# Patient Record
Sex: MALE | Race: WHITE | NOT HISPANIC OR LATINO | Employment: FULL TIME | URBAN - METROPOLITAN AREA
[De-identification: names, ages, dates, MRNs, and addresses within clinical notes are randomized per-mention and may not be internally consistent; named-entity substitution may affect disease eponyms.]

---

## 2022-04-19 ENCOUNTER — OFFICE VISIT (OUTPATIENT)
Dept: URGENT CARE | Facility: CLINIC | Age: 32
End: 2022-04-19
Payer: COMMERCIAL

## 2022-04-19 DIAGNOSIS — M25.561 RIGHT MEDIAL KNEE PAIN: Primary | ICD-10-CM

## 2022-04-19 PROCEDURE — 99203 OFFICE O/P NEW LOW 30 MIN: CPT | Mod: S$GLB,,, | Performed by: FAMILY MEDICINE

## 2022-04-19 PROCEDURE — 99203 PR OFFICE/OUTPT VISIT, NEW, LEVL III, 30-44 MIN: ICD-10-PCS | Mod: S$GLB,,, | Performed by: FAMILY MEDICINE

## 2022-04-19 RX ORDER — HYDROCODONE BITARTRATE AND ACETAMINOPHEN 5; 325 MG/1; MG/1
1 TABLET ORAL EVERY 6 HOURS PRN
COMMUNITY
Start: 2022-02-05

## 2022-04-19 RX ORDER — IBUPROFEN 800 MG/1
800 TABLET ORAL EVERY 6 HOURS PRN
COMMUNITY
Start: 2022-02-05

## 2022-04-19 RX ORDER — AMOXICILLIN 875 MG/1
875 TABLET, FILM COATED ORAL 2 TIMES DAILY
COMMUNITY
Start: 2021-12-11

## 2022-04-19 NOTE — PROGRESS NOTES
Subjective:       Patient ID: Peña Cline is a 32 y.o. male.    Chief Complaint: Knee Pain    Patient works at [employer] and patient's job is [employer]  Date of initial injury: 04/03/2022  Description of injury: Right knee injury  What have you taken OTC for your symptoms: PT has taken OTC meds with significant relief and icing the area.  What is your current pain scale out of 10? 2/10  PT states he was walking and make a quick turn when he heard a popping noise and fell. Pt also felt a pop when it initially happened, he went to the ED for his initial visit. He said he could not put weight on the knee for a week, and he has been doing plenty of at home treatments. Pt states he initially tried to follow up after his initial visit through worker's comp but was put on hold so he decided to walk in and been seen to get released to return to work.    Knee Pain   The incident occurred more than 1 week ago. The incident occurred at work. The injury mechanism was a twisting injury. The pain is present in the right knee. The quality of the pain is described as aching. The pain is at a severity of 2/10. The pain has been improving since onset. Associated symptoms include a loss of motion. Pertinent negatives include no inability to bear weight, loss of sensation, muscle weakness, numbness or tingling. He reports no foreign bodies present. The symptoms are aggravated by movement. He has tried acetaminophen, elevation, ice, rest and non-weight bearing for the symptoms. The treatment provided moderate relief.       Neurological: Negative for numbness.        Objective:      Physical Exam  Musculoskeletal:      Right knee: Tenderness present over the medial joint line. No LCL laxity, MCL laxity, ACL laxity or PCL laxity.      Instability Tests: Medial Sondra test positive.        Legs:       Comments: +thessaly           Assessment:       1. Right medial knee pain        Plan:     pt self  reports sx are improving at this time. Some pain with full flexion on medial aspect of knee. Brace is helping considerably. Will see in 3 weeks.        Patient Instructions: Attention not to aggravate affected area, Daily home exercises/warm soaks, Use splint as directed   Restrictions: Regular Duty  No follow-ups on file.

## 2022-04-25 VITALS
BODY MASS INDEX: 42.38 KG/M2 | DIASTOLIC BLOOD PRESSURE: 73 MMHG | HEIGHT: 67 IN | TEMPERATURE: 98 F | OXYGEN SATURATION: 98 % | WEIGHT: 270 LBS | RESPIRATION RATE: 16 BRPM | HEART RATE: 79 BPM | SYSTOLIC BLOOD PRESSURE: 121 MMHG

## 2022-05-11 ENCOUNTER — TELEPHONE (OUTPATIENT)
Dept: URGENT CARE | Facility: CLINIC | Age: 32
End: 2022-05-11
Payer: MEDICAID

## 2022-05-11 NOTE — TELEPHONE ENCOUNTER
Called patient in reference to his missed [facility] appointment, no answer, left a voice message for the patient and the reason for the call. HERMELINDA